# Patient Record
(demographics unavailable — no encounter records)

---

## 2018-04-08 NOTE — PDOC
History of Present Illness





- General


History Source: Patient


Exam Limitations: Other (this am)





- History of Present Illness


Quality: denies: burning


Abdominal Pain Onset Location: reports: suprapubic


Pain Radiation: reports: back





<Brianne RuizEvangelistAnahi - Last Filed: 18 13:27>





<Reema Harrison - Last Filed: 18 14:19>





- General


Chief Complaint: Pain


Stated Complaint: REVISIT/ PAIN (18 WKS PREGNANT)


Time Seen by Provider: 18 10:32





Past History





- Past Medical History


COPD: No





- Suicide/Smoking/Psychosocial Hx


Smoking History: Never smoked


Information on smoking cessation initiated: No


Hx Alcohol Use: No


Drug/Substance Use Hx: No


Substance Use Type: None





<Brianne RuizEvangelistAnahi - Last Filed: 18 13:27>





<Reema Harrison - Last Filed: 18 14:19>





- Past Medical History


Allergies/Adverse Reactions: 


 Allergies











Allergy/AdvReac Type Severity Reaction Status Date / Time


 


Penicillins Allergy   Verified 18 09:59











Home Medications: 


Ambulatory Orders





NK [No Known Home Medication]  10/02/15 











**Review of Systems





- Review of Systems


Constitutional: No: Chills, Fever


ABD/GI: No: Nausea, Vomiting


: No: Dysuria





<Brianne RuizJulisa - Last Filed: 18 13:27>





*Physical Exam





- Vital Signs


 Last Vital Signs











Temp Pulse Resp BP Pulse Ox


 


 98.6 F   88   18   131/90   100 


 


 18 09:57  18 09:57  18 09:57  18 09:57  18 09:57














- Physical Exam


General Appearance: Yes: Appropriately Dressed.  No: Apparent Distress


HEENT: positive: Normal Voice


Neck: positive: Supple


Respiratory/Chest: negative: Respiratory Distress


Female Pelvic Exam: positive: normal external exam, cervical os closed, normal 

adnexa.  negative: CMT, discharge, vaginal bleeding


Gastrointestinal/Abdominal: positive: Normal Bowel Sounds, Soft, Other (no ttp 

over mcburneys).  negative: Tender, Guarding, Rebound


Musculoskeletal: negative: CVA Tenderness


Integumentary: positive: Dry, Warm


Neurologic: positive: Fully Oriented, Alert, Normal Mood/Affect





<Lilia Ruiz - Last Filed: 18 13:27>





- Vital Signs


 Last Vital Signs











Temp Pulse Resp BP Pulse Ox


 


 98.6 F   88   18   131/90   100 


 


 18 09:57  18 09:57  18 09:57  18 09:57  18 09:57














<Reema Harrison - Last Filed: 18 14:19>





ED Treatment Course





- RADIOLOGY


Radiology Studies Ordered: 














 Category Date Time Status


 


 PREGNANCY LIMITED US [US] Stat Ultrasound  18 10:46 Ordered














<Lilia Ruiz - Last Filed: 18 13:27>





Medical Decision Making





- Medical Decision Making





18 10:48





26-year-old female, , approximately 18 weeks by dates, has had prenatal 

care with ultrasound with no issues with pregnancy so far, seen in ED yesterday 

with urinary frequency 2 days.  Denied abdominal pain to provider at the time 

and had unremarkable exam. UA was negative for signs of infection. Was 

discharged to f/u with her GYN. Returns today stating she now has some mild 

suprapubic cramping now, no vaginal bleed, nausea, vomiting, fever or chills.  

Requesting an ultrasound today  Of note, patient is under different name 

yesterday,  Chuck Dumont, w/ [id number]. Registrar aware





See exam





2nd trimester pain


No trauma


Well nir w/ unremarkable exam, no evidence of appy or pyelo


UA neg yesterday


-US pending








18 13:22


US read as fetus c/w 18 weeks, 4 days w/ fetal cardiac activity and fetal 

movement w/ no e/o previa, retroplacental or collection. Pt stable for 

discharge to f/u with her GYN











<Lilia Ruiz - Last Filed: 18 13:27>





*DC/Admit/Observation/Transfer





<Lilia Ruiz - Last Filed: 18 13:27>





- Attestations


Physician Attestion: 





I reviewed the case with the mid-level practitioner and agree with the mid-

level practitioner's assessment, diagnosis and disposition.








<Reema Harrison - Last Filed: 18 14:19>


Diagnosis at time of Disposition: 


 Abdominal pain affecting pregnancy








- Discharge Dispostion


Disposition: HOME


Condition at time of disposition: Stable





- Referrals


Referrals: 


Josiah Maria MD [Primary Care Provider] - 





- Patient Instructions


Printed Discharge Instructions:  Managing Symptoms of Pregnancy


Additional Instructions: 


Your ultrasound showed that your fetus is about 18 weeks, 4 days, with heart 

activity and is moving.


Take Tylenol as needed for pain and follow-up with her GYN this coming week





- Post Discharge Activity